# Patient Record
Sex: FEMALE | Race: OTHER
[De-identification: names, ages, dates, MRNs, and addresses within clinical notes are randomized per-mention and may not be internally consistent; named-entity substitution may affect disease eponyms.]

---

## 2019-09-10 ENCOUNTER — HOSPITAL ENCOUNTER (EMERGENCY)
Dept: HOSPITAL 61 - ER | Age: 29
LOS: 1 days | Discharge: HOME | End: 2019-09-11
Payer: COMMERCIAL

## 2019-09-10 VITALS — WEIGHT: 174 LBS | HEIGHT: 59 IN | BODY MASS INDEX: 35.08 KG/M2

## 2019-09-10 DIAGNOSIS — N39.0: ICD-10-CM

## 2019-09-10 DIAGNOSIS — N83.201: Primary | ICD-10-CM

## 2019-09-10 LAB
% BANDS: 4 % (ref 0–9)
% LYMPHS: 13 % (ref 24–48)
% MONOS: 4 % (ref 0–10)
% SEGS: 79 % (ref 35–66)
ALBUMIN SERPL-MCNC: 3.5 G/DL (ref 3.4–5)
ALBUMIN/GLOB SERPL: 0.8 {RATIO} (ref 1–1.7)
ALP SERPL-CCNC: 72 U/L (ref 46–116)
ALT SERPL-CCNC: 18 U/L (ref 14–59)
ANION GAP SERPL CALC-SCNC: 12 MMOL/L (ref 6–14)
APTT BLD: 27 SEC (ref 24–38)
APTT PPP: (no result) S
AST SERPL-CCNC: 13 U/L (ref 15–37)
BACTERIA #/AREA URNS HPF: 0 /HPF
BASOPHILS # BLD AUTO: 0 X10^3/UL (ref 0–0.2)
BASOPHILS NFR BLD: 0 % (ref 0–3)
BILIRUB SERPL-MCNC: 0.3 MG/DL (ref 0.2–1)
BILIRUB UR QL STRIP: (no result)
BUN SERPL-MCNC: 8 MG/DL (ref 7–20)
BUN/CREAT SERPL: 13 (ref 6–20)
CALCIUM SERPL-MCNC: 8.3 MG/DL (ref 8.5–10.1)
CHLORIDE SERPL-SCNC: 104 MMOL/L (ref 98–107)
CO2 SERPL-SCNC: 23 MMOL/L (ref 21–32)
CREAT SERPL-MCNC: 0.6 MG/DL (ref 0.6–1)
EOSINOPHIL NFR BLD: 0 % (ref 0–3)
EOSINOPHIL NFR BLD: 0.1 X10^3/UL (ref 0–0.7)
ERYTHROCYTE [DISTWIDTH] IN BLOOD BY AUTOMATED COUNT: 13.2 % (ref 11.5–14.5)
FIBRINOGEN PPP-MCNC: (no result) MG/DL
GFR SERPLBLD BASED ON 1.73 SQ M-ARVRAT: 116.6 ML/MIN
GLOBULIN SER-MCNC: 4.4 G/DL (ref 2.2–3.8)
GLUCOSE SERPL-MCNC: 100 MG/DL (ref 70–99)
HCT VFR BLD CALC: 37.2 % (ref 36–47)
HGB BLD-MCNC: 12.4 G/DL (ref 12–15.5)
LIPASE: 172 U/L (ref 73–393)
LYMPHOCYTES # BLD: 1.8 X10^3/UL (ref 1–4.8)
LYMPHOCYTES NFR BLD AUTO: 10 % (ref 24–48)
MCH RBC QN AUTO: 29 PG (ref 25–35)
MCHC RBC AUTO-ENTMCNC: 33 G/DL (ref 31–37)
MCV RBC AUTO: 86 FL (ref 79–100)
MONO #: 1.1 X10^3/UL (ref 0–1.1)
MONOCYTES NFR BLD: 6 % (ref 0–9)
NEUT #: 15 X10^3/UL (ref 1.8–7.7)
NEUTROPHILS NFR BLD AUTO: 83 % (ref 31–73)
NITRITE UR QL STRIP: NEGATIVE
PH UR STRIP: 6 [PH]
PLATELET # BLD AUTO: 227 X10^3/UL (ref 140–400)
PLATELET # BLD EST: ADEQUATE 10*3/UL
POTASSIUM SERPL-SCNC: 3.9 MMOL/L (ref 3.5–5.1)
PROT SERPL-MCNC: 7.9 G/DL (ref 6.4–8.2)
PROT UR STRIP-MCNC: 100 MG/DL
PROTHROMBIN TIME: 13.7 SEC (ref 11.7–14)
RBC # BLD AUTO: 4.31 X10^6/UL (ref 3.5–5.4)
RBC #/AREA URNS HPF: (no result) /HPF (ref 0–2)
SODIUM SERPL-SCNC: 139 MMOL/L (ref 136–145)
SQUAMOUS #/AREA URNS LPF: (no result) /LPF
UROBILINOGEN UR-MCNC: 1 MG/DL
WBC # BLD AUTO: 18.1 X10^3/UL (ref 4–11)
WBC #/AREA URNS HPF: (no result) /HPF (ref 0–4)

## 2019-09-10 PROCEDURE — 85610 PROTHROMBIN TIME: CPT

## 2019-09-10 PROCEDURE — 81001 URINALYSIS AUTO W/SCOPE: CPT

## 2019-09-10 PROCEDURE — 96376 TX/PRO/DX INJ SAME DRUG ADON: CPT

## 2019-09-10 PROCEDURE — 36415 COLL VENOUS BLD VENIPUNCTURE: CPT

## 2019-09-10 PROCEDURE — 99285 EMERGENCY DEPT VISIT HI MDM: CPT

## 2019-09-10 PROCEDURE — 96365 THER/PROPH/DIAG IV INF INIT: CPT

## 2019-09-10 PROCEDURE — 87086 URINE CULTURE/COLONY COUNT: CPT

## 2019-09-10 PROCEDURE — 85007 BL SMEAR W/DIFF WBC COUNT: CPT

## 2019-09-10 PROCEDURE — 76856 US EXAM PELVIC COMPLETE: CPT

## 2019-09-10 PROCEDURE — 83690 ASSAY OF LIPASE: CPT

## 2019-09-10 PROCEDURE — 85025 COMPLETE CBC W/AUTO DIFF WBC: CPT

## 2019-09-10 PROCEDURE — 80053 COMPREHEN METABOLIC PANEL: CPT

## 2019-09-10 PROCEDURE — 74177 CT ABD & PELVIS W/CONTRAST: CPT

## 2019-09-10 PROCEDURE — 96375 TX/PRO/DX INJ NEW DRUG ADDON: CPT

## 2019-09-10 PROCEDURE — 81025 URINE PREGNANCY TEST: CPT

## 2019-09-10 PROCEDURE — 85730 THROMBOPLASTIN TIME PARTIAL: CPT

## 2019-09-10 PROCEDURE — 76830 TRANSVAGINAL US NON-OB: CPT

## 2019-09-10 NOTE — PHYS DOC
Adult General


Chief Complaint


Chief Complaint:  ABDOMINAL PAIN





HPI


HPI





Patient is a 29  year old female who presented to ER today for evaluation of 

lower abdominal pain started about 4 days ago. She said the pain became more 

severe today after she started having her period.   She denies any fever. 

Patient went to see her OB/GYN doctor today who put her on some ibuprofen and 

ordered pelvic ultrasound somehow she came to the ER today for evaluation after 

she left her OB/GYN doctor clinic.





Review of Systems


Review of Systems





Constitutional: Denies fever or chills []


Eyes: Denies change in visual acuity, redness, or eye pain []


HENT: Denies nasal congestion or sore throat []


Respiratory: Denies cough or shortness of breath []


Cardiovascular: No additional information not addressed in HPI []


GI: Positive for abdominal pain, nausea, NO vomiting, bloody stools or diarrhea 

[]


: Denies dysuria or hematuria.  Patient is on her period. 


Musculoskeletal: Denies back pain or joint pain []


Integument: Denies rash or skin lesions []


Neurologic: Denies headache, focal weakness or sensory changes []


Endocrine: Denies polyuria or polydipsia []





All other systems were reviewed and found to be within normal limits, except as 

documented in this note.





Current Medications


Current Medications





Current Medications








 Medications


  (Trade)  Dose


 Ordered  Sig/Eric  Start Time


 Stop Time Status Last Admin


Dose Admin


 


 Fentanyl Citrate


  (Fentanyl 2ml


 Vial)  50 mcg  1X  ONCE  9/10/19 23:15


 9/10/19 23:26 DC 9/10/19 23:22


50 MCG


 


 Iohexol


  (Omnipaque 300


 Mg/ml)  75 ml  1X  ONCE  9/10/19 21:00


 9/10/19 21:01 DC 9/10/19 21:09


75 ML


 


 Ketorolac


 Tromethamine


  (Toradol 30mg


 Vial)  30 mg  1X  ONCE  19 00:15


 19 00:45 DC 19 00:26


30 MG


 


 Piperacillin Sod/


 Tazobactam Sod


 3.375 gm/Sodium


 Chloride  50 ml @ 


 100 mls/hr  1X  ONCE  9/10/19 21:15


 9/10/19 21:44 DC 9/10/19 21:53


100 MLS/HR











Allergies


Allergies





Allergies








Coded Allergies Type Severity Reaction Last Updated Verified


 


  No Known Drug Allergies    7/2/15 No











Physical Exam


Physical Exam





Constitutional: Well developed, well nourished, no acute distress, non-toxic 

appearance. []


HENT: Normocephalic, atraumatic, bilateral external ears normal, oropharynx 

moist, no oral exudates, nose normal. []


Eyes: PERRLA, EOMI, conjunctiva normal, no discharge. [] 


Neck: Normal range of motion, no tenderness, supple, no stridor. [] 


Cardiovascular:Heart rate regular rhythm, no murmur []


Lungs & Thorax:  Bilateral breath sounds clear to auscultation []


Abdomen: Bowel sounds normal, soft, There is  tenderness to palpation in RLQ, 

SUPRAPUBIC AND LLQ AREA, NO REBOUND, NO GUARDING, no masses, no pulsatile 

masses. [] 


Skin: Warm, dry, no erythema, no rash. [] 


Back: No tenderness, no CVA tenderness. [] 


Extremities: No tenderness, no cyanosis, no clubbing, ROM intact, no edema. [] 


Neurologic: Alert and oriented X 3, normal motor function, normal sensory 

function, no focal deficits noted. []


Psychologic: Affect normal, judgement normal, mood normal. []





Current Patient Data


Vital Signs





                                   Vital Signs








  Date Time  Temp Pulse Resp B/P (MAP) Pulse Ox O2 Delivery O2 Flow Rate FiO2


 


9/10/19 23:22   18  97 Room Air  


 


9/10/19 22:00  70  105/63 (77)    


 


9/10/19 19:15 99.1       





 99.1       








Lab Values





                                Laboratory Tests








Test


 9/10/19


18:58 9/10/19


19:22 9/10/19


19:55 9/10/19


20:37


 


Urine Collection Type Unknown     


 


Urine Color Red     


 


Urine Clarity Cloudy     


 


Urine pH 6.0     


 


Urine Specific Gravity >=1.030     


 


Urine Protein


 100 mg/dL


(NEG-TRACE) 


 


 





 


Urine Glucose (UA)


 Negative mg/dL


(NEG) 


 


 





 


Urine Ketones (Stick)


 15 mg/dL (NEG)


 


 


 





 


Urine Blood Large (NEG)     


 


Urine Nitrite


 Negative (NEG)


 


 


 





 


Urine Bilirubin


 Moderate (NEG)


 


 


 





 


Urine Urobilinogen Dipstick


 1.0 mg/dL (0.2


mg/dL) 


 


 





 


Urine Leukocyte Esterase Small (NEG)     


 


Urine RBC


 Tntc /HPF


(0-2) 


 


 





 


Urine WBC


 5-10 /HPF


(0-4) 


 


 





 


Urine Squamous Epithelial


Cells Few /LPF  


 


 


 





 


Urine Bacteria


 0 /HPF (0-FEW)


 


 


 





 


Urine Mucus Mod /LPF     


 


White Blood Count


 


 18.1 x10^3/uL


(4.0-11.0)  H 


 





 


Red Blood Count


 


 4.31 x10^6/uL


(3.50-5.40) 


 





 


Hemoglobin


 


 12.4 g/dL


(12.0-15.5) 


 





 


Hematocrit


 


 37.2 %


(36.0-47.0) 


 





 


Mean Corpuscular Volume


 


 86 fL ()


 


 





 


Mean Corpuscular Hemoglobin  29 pg (25-35)    


 


Mean Corpuscular Hemoglobin


Concent 


 33 g/dL


(31-37) 


 





 


Red Cell Distribution Width


 


 13.2 %


(11.5-14.5) 


 





 


Platelet Count


 


 227 x10^3/uL


(140-400) 


 





 


Neutrophils (%) (Auto)  83 % (31-73)  H  


 


Lymphocytes (%) (Auto)  10 % (24-48)  L  


 


Monocytes (%) (Auto)  6 % (0-9)    


 


Eosinophils (%) (Auto)  0 % (0-3)    


 


Basophils (%) (Auto)  0 % (0-3)    


 


Neutrophils # (Auto)


 


 15.0 x10^3/uL


(1.8-7.7)  H 


 





 


Lymphocytes # (Auto)


 


 1.8 x10^3/uL


(1.0-4.8) 


 





 


Monocytes # (Auto)


 


 1.1 x10^3/uL


(0.0-1.1) 


 





 


Eosinophils # (Auto)


 


 0.1 x10^3/uL


(0.0-0.7) 


 





 


Basophils # (Auto)


 


 0.0 x10^3/uL


(0.0-0.2) 


 





 


Segmented Neutrophils %  79 % (35-66)  H  


 


Band Neutrophils %  4 % (0-9)    


 


Lymphocytes %  13 % (24-48)  L  


 


Monocytes %  4 % (0-10)    


 


Platelet Estimate


 


 Adequate


(ADEQUATE) 


 





 


Prothrombin Time


 


 13.7 SEC


(11.7-14.0) 


 





 


Prothrombin Time INR  1.1 (0.8-1.1)    


 


Activated Partial


Thromboplast Time 


 27 SEC (24-38)


 


 





 


Sodium Level


 


 


 139 mmol/L


(136-145) 





 


Potassium Level


 


 


 3.9 mmol/L


(3.5-5.1) 





 


Chloride Level


 


 


 104 mmol/L


() 





 


Carbon Dioxide Level


 


 


 23 mmol/L


(21-32) 





 


Anion Gap   12 (6-14)   


 


Blood Urea Nitrogen


 


 


 8 mg/dL (7-20)


 





 


Creatinine


 


 


 0.6 mg/dL


(0.6-1.0) 





 


Estimated GFR


(Cockcroft-Gault) 


 


 116.6  


 





 


BUN/Creatinine Ratio   13 (6-20)   


 


Glucose Level


 


 


 100 mg/dL


(70-99)  H 





 


Calcium Level


 


 


 8.3 mg/dL


(8.5-10.1)  L 





 


Total Bilirubin


 


 


 0.3 mg/dL


(0.2-1.0) 





 


Aspartate Amino Transferase


(AST) 


 


 13 U/L (15-37)


L 





 


Alanine Aminotransferase (ALT)


 


 


 18 U/L (14-59)


 





 


Alkaline Phosphatase


 


 


 72 U/L


() 





 


Total Protein


 


 


 7.9 g/dL


(6.4-8.2) 





 


Albumin


 


 


 3.5 g/dL


(3.4-5.0) 





 


Albumin/Globulin Ratio


 


 


 0.8 (1.0-1.7)


L 





 


Lipase


 


 


 172 U/L


() 





 


POC Urine HCG, Qualitative


 


 


 


 Hcg negative


(Negative)





                                Laboratory Tests


9/10/19 19:22








                                Laboratory Tests


9/10/19 19:55











EKG


EKG


[]





Radiology/Procedures


Radiology/Procedures


[][facility]


                    [address] [place] [address]


                                 [phone]


                                        


                                 IMAGING REPORT





                                     Signed





PATIENT: TONI CERDA  ACCOUNT: [id number]     MRN#: [id number]


: 1990           LOCATION: ER              AGE: 29


SEX: F                    EXAM DT: 09/10/19         ACCESSION#: [id number]


STATUS: REG ER            ORD. PHYSICIAN: CARLOTA LARES DO


REASON: right side pelvic pain


PROCEDURE: PELVIS W/TV





Pelvic ultrasound to include transabdominal and transvaginal imaging 


9/10/2019


 


CLINICAL HISTORY: Left adnexal pain. Left adnexal mass seen on recent CT 


scan.


 


TECHNIQUE: Using the distended urinary bladder as a sonographic window, a 


real-time ultrasound examination of the pelvis was performed. Additionally


in an attempt to better evaluate the uterus and adnexa, a transvaginal 


ultrasound study was performed. Multiple images were obtained.


 


FINDINGS: The uterus is within normal limits in size and echogenicity. It 


measures 9.6 x 6.1 x 6.0 cm in longitudinal, transverse, and AP 


dimensions. The endometrial echo complex measures 9 mm in thickness which 


is within normal limits. No focal abnormality of the uterus is seen.


 


The right ovary is normal in size and echogenicity. It measures 3.1 x 2.5 


x 2.3 cm in size. A complex mass is seen in the left adnexa which measures


4.0 x 3.6 x 3.5 cm in size cm. This corresponds to the abnormality seen on


patients CT scan. Its ultrasound and CT appearance are consistent with a 


dermoid cyst/teratoma. Normal color-flow and pulse Doppler imaging to both


ovaries is seen. No free fluid is noted.


 


IMPRESSION: 4 cm dermoid cyst/teratoma is seen in the left adnexa which 


corresponds to the abnormality seen on the patients CT scan. Otherwise 


negative study.


 


Electronically signed by: Mushtaq Penaloza MD (9/10/2019 11:46 PM) [facility]














DICTATED and SIGNED BY:     MUSHTAQ PENALOZA MD


DATE:     09/10/19 2346


                            [facility]


                    [address] [place] [address]


                                 [phone]


                                        


                                 IMAGING REPORT





                                     Signed





PATIENT: TONI CERDA  ACCOUNT: [id number]     MRN#: [id number]


: 1990           LOCATION: ER              AGE: 29


SEX: F                    EXAM DT: 09/10/19         ACCESSION#: [id number]


STATUS: REG ER            ORD. PHYSICIAN: CARLOTA LARES DO


REASON: LLQ abdominal pain


PROCEDURE: CT ABD PELV W/ IV CONTRST ONLY





                                        


                                    ADDENDUM


Addendum:


 


Please note that the adnexal mass is within the left adnexa.


 


Electronically signed by: Mushtaq Penaloza MD (9/10/2019 10:43 PM) [facility]














DICTATED AND SIGNED BY:     MUSHTAQ PENALOZA MD


DATE:     09/10/19  2244





CC: BREE COLLINS MD; CARLOTA LARES DO ~


Exam: CT abdomen and pelvis with contrast


 


INDICATION: Left lower quadrant pain


 


TECHNIQUE: Sequential axial images through the abdomen and pelvis obtained


following the administration of 75 mL of Omni 300 IV contrast. Sagittal 


and coronal reformatted images were reconstructed from the axial data and 


reviewed.


 


Comparisons: None


 


FINDINGS:


Heart size is normal. No pericardial effusion. Visualized lung bases are 


clear. No pleural effusion.


 


Liver, spleen, pancreas, gallbladder and adrenals are unremarkable.


 


Kidneys demonstrate symmetric and spine. No perinephric inflammation or 


hydronephrosis. No renal or ureteral calculi are identified.


 


Bladder is partially distended and appears thin-walled. Uterus is not 


enlarged. Within the right adnexa there is a 4.2 x 3.7 cm mass which 


contains fat and calcifications.


 


Large and small bowel are unremarkable. Appendix is normal. No free 


intra-abdominal air or fluid. No obstruction.


 


Abdominal aorta has a normal course and caliber. Abdominal vasculature is 


patent.


 


No enlarged abdominal lymph nodes are identified.


 


No suspicious osseous lesions or acute fractures.


 


IMPRESSION:


1.  Right adnexal lesion measuring 4.2 x 3.8 cm which contains fat and 


calcifications. This is favored represent either an ovarian dermoid or 


teratoma. Given pain further evaluation with ultrasound is recommended.


2.  Otherwise, unremarkable evaluation of the abdomen and pelvis.


 


 


Exposure: One or more of the following in the visualized dose reduction 


techniques were utilized for this examination:


1.  Automated exposure control


2.  Adjustment of the MA and/or KV according to patient size


3.  Use of iterative of reconstructive technique


 


Electronically signed by: Graeme Curran MD (9/10/2019 9:38 PM) [facility]














DICTATED and SIGNED BY:     GRAEME CURRAN MD


DATE:     09/10/19 2138





Course & Med Decision Making


Course & Med Decision Making


Pertinent Labs and Imaging studies reviewed. (See chart for details)





Patient is currently on her period.  CT scan of her abdomen and pelvic, pelvic 

ultrasound shown 4 cm left side ovarian cyst consistent with teratoma.  Patient 

was given a total of 100 mcg of Fentanyl iv and 30 mg of toradol iv in the ER.  

Patient was found to have UTI.  She was given a dose of zosyn iv in the ER.  


Patient will need to follow up with her OB.GYN doctor again for reevaluation.  

She already had a prescription from her OB.GYN doctor for IBUPROFEN.  She will 

be discharged home with tramadol to take with ibuprofen as needed for pain.





Dragon Disclaimer


Dragon Disclaimer


This electronic medical record was generated, in whole or in part, using a voice

 recognition dictation system.





Departure


Departure


Impression:  


   Primary Impression:  


   Ovarian cyst


   Additional Impression:  


   UTI (urinary tract infection)


Disposition:   HOME, SELF-CARE


Condition:  IMPROVED


Referrals:  


BREE COLLINS MD (PCP)


FOLLOW UP WITH YOUR DOCTOR THIS WEEK FOR REEVALUATION.


Patient Instructions:  Ovarian Cyst, Urinary Tract Infection


Scripts


Cephalexin (CEPHALEXIN) 500 Mg Tablet


1 TAB PO QID, #28 TAB


   Prov: CARLOTA LARES DO         19 


Tramadol Hcl (TRAMADOL HCL) 50 Mg Tablet


50 MG PO Q6HRS PRN for PAIN, #12 TAB


   Prov: CARLOTA LARES DO         19





Problem Qualifiers











CARLOTA LARES DO                Sep 10, 2019 19:21

## 2019-09-10 NOTE — RAD
Exam: CT abdomen and pelvis with contrast

 

INDICATION: Left lower quadrant pain

 

TECHNIQUE: Sequential axial images through the abdomen and pelvis obtained

following the administration of 75 mL of Omni 300 IV contrast. Sagittal 

and coronal reformatted images were reconstructed from the axial data and 

reviewed.

 

Comparisons: None

 

FINDINGS:

Heart size is normal. No pericardial effusion. Visualized lung bases are 

clear. No pleural effusion.

 

Liver, spleen, pancreas, gallbladder and adrenals are unremarkable.

 

Kidneys demonstrate symmetric and spine. No perinephric inflammation or 

hydronephrosis. No renal or ureteral calculi are identified.

 

Bladder is partially distended and appears thin-walled. Uterus is not 

enlarged. Within the right adnexa there is a 4.2 x 3.7 cm mass which 

contains fat and calcifications.

 

Large and small bowel are unremarkable. Appendix is normal. No free 

intra-abdominal air or fluid. No obstruction.

 

Abdominal aorta has a normal course and caliber. Abdominal vasculature is 

patent.

 

No enlarged abdominal lymph nodes are identified.

 

No suspicious osseous lesions or acute fractures.

 

IMPRESSION:

1.  Right adnexal lesion measuring 4.2 x 3.8 cm which contains fat and 

calcifications. This is favored represent either an ovarian dermoid or 

teratoma. Given pain further evaluation with ultrasound is recommended.

2.  Otherwise, unremarkable evaluation of the abdomen and pelvis.

 

 

Exposure: One or more of the following in the visualized dose reduction 

techniques were utilized for this examination:

1.  Automated exposure control

2.  Adjustment of the MA and/or KV according to patient size

3.  Use of iterative of reconstructive technique

 

Electronically signed by: Graeme Vasquez MD (9/10/2019 9:38 PM) [facility]

## 2019-09-10 NOTE — RAD
Pelvic ultrasound to include transabdominal and transvaginal imaging 

9/10/2019

 

CLINICAL HISTORY: Left adnexal pain. Left adnexal mass seen on recent CT 

scan.

 

TECHNIQUE: Using the distended urinary bladder as a sonographic window, a 

real-time ultrasound examination of the pelvis was performed. Additionally

in an attempt to better evaluate the uterus and adnexa, a transvaginal 

ultrasound study was performed. Multiple images were obtained.

 

FINDINGS: The uterus is within normal limits in size and echogenicity. It 

measures 9.6 x 6.1 x 6.0 cm in longitudinal, transverse, and AP 

dimensions. The endometrial echo complex measures 9 mm in thickness which 

is within normal limits. No focal abnormality of the uterus is seen.

 

The right ovary is normal in size and echogenicity. It measures 3.1 x 2.5 

x 2.3 cm in size. A complex mass is seen in the left adnexa which measures

4.0 x 3.6 x 3.5 cm in size cm. This corresponds to the abnormality seen on

patients CT scan. Its ultrasound and CT appearance are consistent with a 

dermoid cyst/teratoma. Normal color-flow and pulse Doppler imaging to both

ovaries is seen. No free fluid is noted.

 

IMPRESSION: 4 cm dermoid cyst/teratoma is seen in the left adnexa which 

corresponds to the abnormality seen on the patients CT scan. Otherwise 

negative study.

 

Electronically signed by: Mushtaq Penaloza MD (9/10/2019 11:46 PM) [facility]

## 2019-09-11 VITALS — DIASTOLIC BLOOD PRESSURE: 59 MMHG | SYSTOLIC BLOOD PRESSURE: 95 MMHG
